# Patient Record
Sex: FEMALE | URBAN - METROPOLITAN AREA
[De-identification: names, ages, dates, MRNs, and addresses within clinical notes are randomized per-mention and may not be internally consistent; named-entity substitution may affect disease eponyms.]

---
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
## 2022-04-19 NOTE — PROCEDURE: MEDICATION COUNSELING
Called to r/s 06/01 [employer] appt. Unable to leave VM, so sent [employer] message with K pod #.    Convert to video and offer held slot on May 11th, 18th, or 25th.   Imiquimod Counseling:  I discussed with the patient the risks of imiquimod including but not limited to erythema, scaling, itching, weeping, crusting, and pain.  Patient understands that the inflammatory response to imiquimod is variable from person to person and was educated regarded proper titration schedule.  If flu-like symptoms develop, patient knows to discontinue the medication and contact us.

## 2023-06-14 NOTE — PROCEDURE: MEDICATION COUNSELING
This was a shared visit with the RASHID. I reviewed and verified the documentation and independently performed the documented: Mirvaso Pregnancy And Lactation Text: This medication has not been assigned a Pregnancy Risk Category. It is unknown if the medication is excreted in breast milk.
